# Patient Record
Sex: MALE | Race: BLACK OR AFRICAN AMERICAN | Employment: UNEMPLOYED | URBAN - METROPOLITAN AREA
[De-identification: names, ages, dates, MRNs, and addresses within clinical notes are randomized per-mention and may not be internally consistent; named-entity substitution may affect disease eponyms.]

---

## 2018-09-20 ENCOUNTER — APPOINTMENT (OUTPATIENT)
Dept: ULTRASOUND IMAGING | Facility: HOSPITAL | Age: 18
End: 2018-09-20
Attending: EMERGENCY MEDICINE
Payer: MEDICAID

## 2018-09-20 ENCOUNTER — HOSPITAL ENCOUNTER (EMERGENCY)
Facility: HOSPITAL | Age: 18
Discharge: HOME | End: 2018-09-20
Attending: EMERGENCY MEDICINE
Payer: MEDICAID

## 2018-09-20 VITALS
DIASTOLIC BLOOD PRESSURE: 70 MMHG | RESPIRATION RATE: 18 BRPM | HEIGHT: 73 IN | OXYGEN SATURATION: 100 % | TEMPERATURE: 98 F | HEART RATE: 72 BPM | BODY MASS INDEX: 23.19 KG/M2 | WEIGHT: 175 LBS | SYSTOLIC BLOOD PRESSURE: 130 MMHG

## 2018-09-20 DIAGNOSIS — N39.0 URINARY TRACT INFECTION WITHOUT HEMATURIA, SITE UNSPECIFIED: Primary | ICD-10-CM

## 2018-09-20 LAB
BILIRUB UR QL: NEGATIVE
C TRACH DNA SPEC QL NAA+PROBE: NEGATIVE
COLOR UR: YELLOW
GLUCOSE UR-MCNC: NEGATIVE MG/DL
HGB UR QL STRIP.AUTO: NEGATIVE
KETONES UR-MCNC: NEGATIVE MG/DL
N GONORRHOEA DNA SPEC QL NAA+PROBE: NEGATIVE
NITRITE UR QL STRIP.AUTO: NEGATIVE
PH UR: 7 [PH] (ref 5–8)
PROT UR-MCNC: NEGATIVE MG/DL
RBC #/AREA URNS AUTO: 5 /HPF
SP GR UR STRIP: 1.02 (ref 1–1.03)
UROBILINOGEN UR STRIP-ACNC: 4
VIT C UR-MCNC: NEGATIVE MG/DL
WBC #/AREA URNS AUTO: 50 /HPF

## 2018-09-20 PROCEDURE — 93975 VASCULAR STUDY: CPT | Performed by: EMERGENCY MEDICINE

## 2018-09-20 PROCEDURE — 99284 EMERGENCY DEPT VISIT MOD MDM: CPT

## 2018-09-20 PROCEDURE — 87591 N.GONORRHOEAE DNA AMP PROB: CPT | Performed by: EMERGENCY MEDICINE

## 2018-09-20 PROCEDURE — 87086 URINE CULTURE/COLONY COUNT: CPT | Performed by: EMERGENCY MEDICINE

## 2018-09-20 PROCEDURE — 87491 CHLMYD TRACH DNA AMP PROBE: CPT | Performed by: EMERGENCY MEDICINE

## 2018-09-20 PROCEDURE — 81001 URINALYSIS AUTO W/SCOPE: CPT | Performed by: EMERGENCY MEDICINE

## 2018-09-20 PROCEDURE — 76870 US EXAM SCROTUM: CPT | Performed by: EMERGENCY MEDICINE

## 2018-09-20 RX ORDER — CIPROFLOXACIN 250 MG/1
250 TABLET, FILM COATED ORAL 2 TIMES DAILY
Qty: 14 TABLET | Refills: 0 | Status: SHIPPED | OUTPATIENT
Start: 2018-09-20 | End: 2018-09-27

## 2018-09-20 NOTE — ED PROVIDER NOTES
Patient Seen in: [facility] AND [facility] Emergency Department    History   Patient presents with:  Male  Problem    Stated Complaint: leg pain    HPI    20 yo male presents with one day of right groin and right testicle pain. No urinary symptoms.  No vomiting exhibits no edema or tenderness. There is right groin and medial right thigh tenderness. Lymphadenopathy:     He has no cervical adenopathy. Neurological: He is alert and oriented to person, place, and time. No cranial nerve deficit.    Skin: Skin is

## 2022-11-11 ENCOUNTER — TELEPHONE (OUTPATIENT)
Dept: FAMILY MEDICINE CLINIC | Facility: CLINIC | Age: 22
End: 2022-11-11

## 2022-11-11 NOTE — TELEPHONE ENCOUNTER
Reason for Call/Symptoms: Pt has sharp dull pain on his groin area on the right side where his leg starts. Pt stated that he did a split and since then the pain has been on/off but for the past 2 days the pain is a 10/10. Onset: The pain started about 1 month or 2 months ago. Courtesy Assessment: constant pain for the past 2 days 10/10. Level of care recommendation: Immediate Care  Advice given to patient: Pt was informed to go to I/C today. Pt stated that he will go to I/C today.

## 2023-05-19 ENCOUNTER — NURSE TRIAGE (OUTPATIENT)
Dept: FAMILY MEDICINE CLINIC | Facility: CLINIC | Age: 23
End: 2023-05-19

## 2023-05-22 ENCOUNTER — OFFICE VISIT (OUTPATIENT)
Dept: FAMILY MEDICINE CLINIC | Facility: CLINIC | Age: 23
End: 2023-05-22

## 2023-05-22 ENCOUNTER — LAB ENCOUNTER (OUTPATIENT)
Dept: LAB | Age: 23
End: 2023-05-22
Payer: MEDICAID

## 2023-05-22 VITALS
HEIGHT: 72 IN | TEMPERATURE: 98 F | BODY MASS INDEX: 21.67 KG/M2 | OXYGEN SATURATION: 95 % | WEIGHT: 160 LBS | HEART RATE: 80 BPM | SYSTOLIC BLOOD PRESSURE: 110 MMHG | DIASTOLIC BLOOD PRESSURE: 68 MMHG

## 2023-05-22 DIAGNOSIS — Z11.3 SCREENING EXAMINATION FOR STD (SEXUALLY TRANSMITTED DISEASE): ICD-10-CM

## 2023-05-22 DIAGNOSIS — R10.31 RIGHT GROIN PAIN: Primary | ICD-10-CM

## 2023-05-22 PROCEDURE — 87491 CHLMYD TRACH DNA AMP PROBE: CPT

## 2023-05-22 PROCEDURE — 3078F DIAST BP <80 MM HG: CPT

## 2023-05-22 PROCEDURE — 36415 COLL VENOUS BLD VENIPUNCTURE: CPT

## 2023-05-22 PROCEDURE — 3008F BODY MASS INDEX DOCD: CPT

## 2023-05-22 PROCEDURE — 87389 HIV-1 AG W/HIV-1&-2 AB AG IA: CPT

## 2023-05-22 PROCEDURE — 86780 TREPONEMA PALLIDUM: CPT

## 2023-05-22 PROCEDURE — 87591 N.GONORRHOEAE DNA AMP PROB: CPT

## 2023-05-22 PROCEDURE — 99204 OFFICE O/P NEW MOD 45 MIN: CPT

## 2023-05-22 PROCEDURE — 3074F SYST BP LT 130 MM HG: CPT

## 2023-05-23 LAB
C TRACH DNA SPEC QL NAA+PROBE: NEGATIVE
N GONORRHOEA DNA SPEC QL NAA+PROBE: NEGATIVE

## 2023-05-24 ENCOUNTER — PATIENT MESSAGE (OUTPATIENT)
Dept: FAMILY MEDICINE CLINIC | Facility: CLINIC | Age: 23
End: 2023-05-24

## 2023-05-24 LAB — T PALLIDUM AB SER QL: NEGATIVE

## 2023-05-25 NOTE — TELEPHONE ENCOUNTER
From: Gerber Ludwig  To: Caryle Lusty, APRN  Sent: 5/24/2023 11:44 AM CDT  Subject: Question regarding CHLAMYDIA/GONOCOCCUS, OWEN    So everything came back good all negative? ?

## 2023-06-16 ENCOUNTER — TELEPHONE (OUTPATIENT)
Dept: FAMILY MEDICINE CLINIC | Facility: CLINIC | Age: 23
End: 2023-06-16

## 2023-06-16 DIAGNOSIS — R10.31 RIGHT GROIN PAIN: Primary | ICD-10-CM

## 2023-07-26 ENCOUNTER — OFFICE VISIT (OUTPATIENT)
Dept: FAMILY MEDICINE CLINIC | Facility: CLINIC | Age: 23
End: 2023-07-26

## 2023-07-26 ENCOUNTER — LAB ENCOUNTER (OUTPATIENT)
Dept: LAB | Facility: REFERENCE LAB | Age: 23
End: 2023-07-26
Attending: FAMILY MEDICINE
Payer: MEDICAID

## 2023-07-26 VITALS
SYSTOLIC BLOOD PRESSURE: 126 MMHG | WEIGHT: 157 LBS | RESPIRATION RATE: 17 BRPM | HEIGHT: 72 IN | DIASTOLIC BLOOD PRESSURE: 77 MMHG | OXYGEN SATURATION: 98 % | HEART RATE: 78 BPM | BODY MASS INDEX: 21.26 KG/M2

## 2023-07-26 DIAGNOSIS — Z11.3 SCREENING EXAMINATION FOR STD (SEXUALLY TRANSMITTED DISEASE): ICD-10-CM

## 2023-07-26 DIAGNOSIS — Z00.00 ANNUAL PHYSICAL EXAM: ICD-10-CM

## 2023-07-26 DIAGNOSIS — Z00.00 ANNUAL PHYSICAL EXAM: Primary | ICD-10-CM

## 2023-07-26 LAB
ANION GAP SERPL CALC-SCNC: 4 MMOL/L (ref 0–18)
BUN BLD-MCNC: 15 MG/DL (ref 7–18)
BUN/CREAT SERPL: 12.9 (ref 10–20)
CALCIUM BLD-MCNC: 9 MG/DL (ref 8.5–10.1)
CHLORIDE SERPL-SCNC: 108 MMOL/L (ref 98–112)
CO2 SERPL-SCNC: 28 MMOL/L (ref 21–32)
CREAT BLD-MCNC: 1.16 MG/DL
DEPRECATED RDW RBC AUTO: 37 FL (ref 35.1–46.3)
EGFRCR SERPLBLD CKD-EPI 2021: 90 ML/MIN/1.73M2 (ref 60–?)
ERYTHROCYTE [DISTWIDTH] IN BLOOD BY AUTOMATED COUNT: 13.4 % (ref 11–15)
GLUCOSE BLD-MCNC: 89 MG/DL (ref 70–99)
HCT VFR BLD AUTO: 39 %
HGB BLD-MCNC: 12.5 G/DL
IRON SATN MFR SERPL: 32 %
IRON SERPL-MCNC: 99 UG/DL
MCH RBC QN AUTO: 24.4 PG (ref 26–34)
MCHC RBC AUTO-ENTMCNC: 32.1 G/DL (ref 31–37)
MCV RBC AUTO: 76.2 FL
OSMOLALITY SERPL CALC.SUM OF ELEC: 290 MOSM/KG (ref 275–295)
PLATELET # BLD AUTO: 304 10(3)UL (ref 150–450)
POTASSIUM SERPL-SCNC: 3.6 MMOL/L (ref 3.5–5.1)
RBC # BLD AUTO: 5.12 X10(6)UL
SODIUM SERPL-SCNC: 140 MMOL/L (ref 136–145)
TIBC SERPL-MCNC: 308 UG/DL (ref 240–450)
TRANSFERRIN SERPL-MCNC: 207 MG/DL (ref 200–360)
WBC # BLD AUTO: 3.8 X10(3) UL (ref 4–11)

## 2023-07-26 PROCEDURE — 36415 COLL VENOUS BLD VENIPUNCTURE: CPT

## 2023-07-26 PROCEDURE — 90471 IMMUNIZATION ADMIN: CPT | Performed by: FAMILY MEDICINE

## 2023-07-26 PROCEDURE — 84466 ASSAY OF TRANSFERRIN: CPT

## 2023-07-26 PROCEDURE — 83540 ASSAY OF IRON: CPT

## 2023-07-26 PROCEDURE — 3008F BODY MASS INDEX DOCD: CPT | Performed by: FAMILY MEDICINE

## 2023-07-26 PROCEDURE — 90472 IMMUNIZATION ADMIN EACH ADD: CPT | Performed by: FAMILY MEDICINE

## 2023-07-26 PROCEDURE — 80048 BASIC METABOLIC PNL TOTAL CA: CPT

## 2023-07-26 PROCEDURE — 90651 9VHPV VACCINE 2/3 DOSE IM: CPT | Performed by: FAMILY MEDICINE

## 2023-07-26 PROCEDURE — 86780 TREPONEMA PALLIDUM: CPT

## 2023-07-26 PROCEDURE — 90715 TDAP VACCINE 7 YRS/> IM: CPT | Performed by: FAMILY MEDICINE

## 2023-07-26 PROCEDURE — 85027 COMPLETE CBC AUTOMATED: CPT

## 2023-07-26 PROCEDURE — 87591 N.GONORRHOEAE DNA AMP PROB: CPT

## 2023-07-26 PROCEDURE — 3074F SYST BP LT 130 MM HG: CPT | Performed by: FAMILY MEDICINE

## 2023-07-26 PROCEDURE — 99395 PREV VISIT EST AGE 18-39: CPT | Performed by: FAMILY MEDICINE

## 2023-07-26 PROCEDURE — 87491 CHLMYD TRACH DNA AMP PROBE: CPT

## 2023-07-26 PROCEDURE — 3078F DIAST BP <80 MM HG: CPT | Performed by: FAMILY MEDICINE

## 2023-07-26 PROCEDURE — 87389 HIV-1 AG W/HIV-1&-2 AB AG IA: CPT

## 2023-07-26 NOTE — PROGRESS NOTES
HPI:    Patti Ramires is a 23year old male presents clinic for annual physical exam.  No acute concerns. Requesting STD screening. Denies new partners. Asymptomatic. Chronic hip pain, has MRI scheduled next week. Normal appetite, balanced diet. Tries to exercise when possible. Normal bowel movements and urination. Normal sleep habits. Was told that his iron was low when he was in the [employer]. Would like levels rechecked. HISTORY:  No past medical history on file. No past surgical history on file. Family History   Problem Relation Age of Onset    Cancer Mother     Diabetes Mother       Social History:   Social History     Socioeconomic History    Marital status: Single   Tobacco Use    Smoking status: Never    Smokeless tobacco: Never   Vaping Use    Vaping Use: Never used   Substance and Sexual Activity    Alcohol use: Not Currently    Drug use: Yes     Types: Cannabis    Sexual activity: Yes        Medications (Active prior to today's visit):  No current outpatient medications on file. Allergies:  No Known Allergies       ROS:   Review of Systems   All other systems reviewed and are negative. PHYSICAL EXAM:      07/26/23  1324   BP: 126/77   BP Location: Right arm   Patient Position: Sitting   Cuff Size: adult   Pulse: 78   Resp: 17   SpO2: 98%   Weight: 157 lb (71.2 kg)   Height: 6' (1.829 m)     Physical Exam  Vitals reviewed. Constitutional:       General: He is not in acute distress. HENT:      Head: Normocephalic and atraumatic. Right Ear: Tympanic membrane, ear canal and external ear normal.      Left Ear: Tympanic membrane, ear canal and external ear normal.      Nose: Nose normal.      Mouth/Throat:      Pharynx: Uvula midline. Eyes:      Conjunctiva/sclera: Conjunctivae normal.      Pupils: Pupils are equal, round, and reactive to light. Neck:      Thyroid: No thyromegaly. Cardiovascular:      Rate and Rhythm: Normal rate and regular rhythm.       Heart sounds: Normal heart sounds. No murmur heard. Pulmonary:      Effort: Pulmonary effort is normal. No respiratory distress. Breath sounds: Normal breath sounds. No wheezing or rales. Abdominal:      General: Bowel sounds are normal. There is no distension. Palpations: Abdomen is soft. Tenderness: There is no abdominal tenderness. There is no guarding or rebound. Musculoskeletal:      Cervical back: Normal range of motion and neck supple. Lymphadenopathy:      Cervical: No cervical adenopathy. Neurological:      Mental Status: He is alert. ASSESSMENT/PLAN:   (Z00.00) Annual physical exam  (primary encounter diagnosis)  (Z11.3) Screening examination for STD (sexually transmitted disease)  Plan:   -Tdap and Gardasil vaccines given  -Annual labs, STI screening ordered  - safe sexual practices advised  - Reinforced healthy diet, lifestyle, and exercise. - Past Medical/Social/Family histories reviewed  - Regular dental visits recommended   - Regular eye exams recommended     Follow up in 1 year or sooner if needed         Responsible party/patient verbalized understanding of information discussed. No barriers to learning observed. Orders This Visit:  Orders Placed This Encounter      HIV AG AB Combo [E]      T Pallidum Screening [place] TREP [E]      CBC, Platelet, No Differential [E]      Iron And Tibc [E]      GARDASIL 9      TETANUS, DIPHTHERIA TOXOIDS AND ACELLULAR PERTUSIS VACCINE (TDAP), >7 YEARS, IM USE      Chlamydia/Gc Amplification [E]      Meds This Visit:  Requested Prescriptions      No prescriptions requested or ordered in this encounter       Imaging & Referrals:  HPV HUMAN PAPILLOMA VIRUS VACC 9 FRANCHESCA 3 DOSE IM  TETANUS, DIPHTHERIA TOXOIDS AND ACELLULAR PERTUSIS VACCINE (TDAP), >7 YEARS, IM USE       The 21st Century cures Act makes medical notes like these available to patients in the interest of transparency. However, be advised that this is a medical document.   It is intended as peer to peer communication. It is written in medical language and may contain abbreviations or verbiage that are unfamiliar. It may appear blunt or direct. Medical documents are intended to carry relevant information, facts as evident, and the clinical opinion of the practitioner. This note was created by [employer] voice recognition. Errors in content may be related to improper recognition by the system; efforts to review and correct have been done but errors may still exist. Please contact me with any questions.        7/26/2023  Charol Merlin, MD

## 2023-07-27 ENCOUNTER — PATIENT MESSAGE (OUTPATIENT)
Dept: FAMILY MEDICINE CLINIC | Facility: CLINIC | Age: 23
End: 2023-07-27

## 2023-07-27 LAB
C TRACH DNA SPEC QL NAA+PROBE: NEGATIVE
N GONORRHOEA DNA SPEC QL NAA+PROBE: NEGATIVE

## 2023-07-27 NOTE — TELEPHONE ENCOUNTER
From: Maritza Alvarez  To: Farrah Batres MD  Sent: 7/27/2023 1:22 PM CDT  Subject: I want to know what they mean & how I can better each and every one    HBG%  HCT%  MCV%   MCH%  WCB%  MCHC%  RDW-SD%   Potassium %  Transferrin%  Can I have the meaning of each term?  And why they are low in percentage

## 2023-07-27 NOTE — TELEPHONE ENCOUNTER
From: Michael Thornton  To: SETH Metcalf  Sent: 7/27/2023 11:53 AM CDT  Subject: Test results     HBG%  HCT%  MCV%   MCH%  WCB%  MCHC%  RDW-SD%   Potassium %  Transferrin%  Can I have the meaning of each term?  And why they are low in percentage

## 2023-07-27 NOTE — TELEPHONE ENCOUNTER
From: Tabatha Sun  To:  Zi Tristan MD  Sent: 7/27/2023 3:33 AM CDT  Subject: Question regarding BASIC METABOLIC PANEL (8)    My potassium levels seem low

## 2023-07-28 LAB — T PALLIDUM AB SER QL: NEGATIVE

## 2023-08-01 ENCOUNTER — HOSPITAL ENCOUNTER (OUTPATIENT)
Dept: MRI IMAGING | Facility: HOSPITAL | Age: 23
Discharge: HOME | End: 2023-08-01
Payer: MEDICAID

## 2023-08-01 DIAGNOSIS — R10.31 RIGHT GROIN PAIN: ICD-10-CM

## 2023-08-01 PROCEDURE — 73721 MRI JNT OF LWR EXTRE W/O DYE: CPT

## 2023-08-02 ENCOUNTER — PATIENT MESSAGE (OUTPATIENT)
Dept: FAMILY MEDICINE CLINIC | Facility: CLINIC | Age: 23
End: 2023-08-02

## 2023-08-02 ENCOUNTER — TELEPHONE (OUTPATIENT)
Dept: FAMILY MEDICINE CLINIC | Facility: CLINIC | Age: 23
End: 2023-08-02

## 2023-08-02 DIAGNOSIS — R10.31 RIGHT GROIN PAIN: Primary | ICD-10-CM

## 2023-08-02 NOTE — TELEPHONE ENCOUNTER
From: Rashi Vazquez  To: Caryle Lusty, APRN  Sent: 8/2/2023 10:50 AM CDT  Subject: Question regarding MRI HIPS, RIGHT (WITHOUT CONTRAST) (CPT=73721)    Yes amanda can we schedule the [facility] specialist?

## 2023-08-02 NOTE — TELEPHONE ENCOUNTER
Please be advised the request for MRI Hip W & W/O (CPT 23320) has been denied by the insurance. The insurance is requesting an urgent [id number].     P2P # [phone], Option 4  Reference # [id number]    The following is the letter received from insurance with reasoning:

## 2023-08-03 NOTE — TELEPHONE ENCOUNTER
From: Alonso Boyle  To:  Callum Bianchi MD  Sent: 8/2/2023 1:53 PM CDT  Subject: My left shoulder has swollen     The same day when I left the mri and the spot where I got one of the vaccines has gotten swollen ken firm and kind of itchy

## 2023-08-10 ENCOUNTER — OFFICE VISIT (OUTPATIENT)
Dept: FAMILY MEDICINE CLINIC | Facility: CLINIC | Age: 23
End: 2023-08-10

## 2023-08-10 VITALS
HEIGHT: 72 IN | TEMPERATURE: 98 F | BODY MASS INDEX: 21.26 KG/M2 | HEART RATE: 79 BPM | WEIGHT: 157 LBS | OXYGEN SATURATION: 96 % | DIASTOLIC BLOOD PRESSURE: 64 MMHG | SYSTOLIC BLOOD PRESSURE: 124 MMHG

## 2023-08-10 DIAGNOSIS — G44.209 ACUTE NON INTRACTABLE TENSION-TYPE HEADACHE: Primary | ICD-10-CM

## 2023-08-10 PROCEDURE — 3008F BODY MASS INDEX DOCD: CPT | Performed by: FAMILY MEDICINE

## 2023-08-10 PROCEDURE — 3074F SYST BP LT 130 MM HG: CPT | Performed by: FAMILY MEDICINE

## 2023-08-10 PROCEDURE — 3078F DIAST BP <80 MM HG: CPT | Performed by: FAMILY MEDICINE

## 2023-08-10 PROCEDURE — 99213 OFFICE O/P EST LOW 20 MIN: CPT | Performed by: FAMILY MEDICINE

## 2023-08-10 RX ORDER — NAPROXEN 500 MG/1
500 TABLET ORAL 2 TIMES DAILY WITH MEALS
Qty: 14 TABLET | Refills: 0 | Status: SHIPPED | OUTPATIENT
Start: 2023-08-10 | End: 2023-08-17

## 2024-04-29 ENCOUNTER — OFFICE VISIT (OUTPATIENT)
Dept: FAMILY MEDICINE CLINIC | Facility: CLINIC | Age: 24
End: 2024-04-29

## 2024-04-29 ENCOUNTER — LAB ENCOUNTER (OUTPATIENT)
Dept: LAB | Age: 24
End: 2024-04-29
Payer: MEDICAID

## 2024-04-29 VITALS
RESPIRATION RATE: 18 BRPM | OXYGEN SATURATION: 98 % | HEIGHT: 72 IN | SYSTOLIC BLOOD PRESSURE: 130 MMHG | BODY MASS INDEX: 23.03 KG/M2 | DIASTOLIC BLOOD PRESSURE: 71 MMHG | HEART RATE: 71 BPM | WEIGHT: 170 LBS

## 2024-04-29 DIAGNOSIS — Z23 NEED FOR VACCINATION: ICD-10-CM

## 2024-04-29 DIAGNOSIS — Z11.3 SCREENING EXAMINATION FOR STD (SEXUALLY TRANSMITTED DISEASE): ICD-10-CM

## 2024-04-29 DIAGNOSIS — F12.90 MARIJUANA USE: ICD-10-CM

## 2024-04-29 DIAGNOSIS — Z00.00 ENCOUNTER FOR ANNUAL PHYSICAL EXAM: Primary | ICD-10-CM

## 2024-04-29 LAB — T PALLIDUM AB SER QL IA: NONREACTIVE

## 2024-04-29 PROCEDURE — 87491 CHLMYD TRACH DNA AMP PROBE: CPT

## 2024-04-29 PROCEDURE — 36415 COLL VENOUS BLD VENIPUNCTURE: CPT

## 2024-04-29 PROCEDURE — 90471 IMMUNIZATION ADMIN: CPT

## 2024-04-29 PROCEDURE — 90651 9VHPV VACCINE 2/3 DOSE IM: CPT

## 2024-04-29 PROCEDURE — 87389 HIV-1 AG W/HIV-1&-2 AB AG IA: CPT

## 2024-04-29 PROCEDURE — 99395 PREV VISIT EST AGE 18-39: CPT

## 2024-04-29 PROCEDURE — 87591 N.GONORRHOEAE DNA AMP PROB: CPT

## 2024-04-29 PROCEDURE — 86780 TREPONEMA PALLIDUM: CPT

## 2024-04-29 NOTE — PROGRESS NOTES
Fifi Caballero is a 23 year old male.  Chief Complaint   Patient presents with    STD     Pt requesting STD testing , pt denies known exposure.     HPI:   Fifi Caballero presented to the clinic for annual physical examination. No acute concerns. No changes in family/personal history. Normal Sleep. Normal appetite. Balanced diet. Normal BM/Urination. Physically active.  Sexually active. Would like STD screening.  Denies known exposure. Alcohol - socially. Marijuana - unable to quantify for me. Says \" a lot, too much\". Interested in smoking cessation.      No current outpatient medications on file.      History reviewed. No pertinent past medical history.   History reviewed. No pertinent surgical history.   Social History:  Social History     Socioeconomic History    Marital status: Single   Tobacco Use    Smoking status: Never    Smokeless tobacco: Never   Vaping Use    Vaping status: Never Used   Substance and Sexual Activity    Alcohol use: Not Currently    Drug use: Yes     Types: Cannabis    Sexual activity: Yes      Family History   Problem Relation Age of Onset    Cancer Mother     Diabetes Mother       No Known Allergies     REVIEW OF SYSTEMS:   Review of Systems   Constitutional:  Negative for activity change.   HENT: Negative.     Eyes: Negative.    Respiratory:  Negative for chest tightness and shortness of breath.    Cardiovascular:  Negative for chest pain and palpitations.   Gastrointestinal:  Negative for abdominal pain, constipation and diarrhea.   Genitourinary: Negative.  Negative for difficulty urinating.   Musculoskeletal: Negative.    Skin: Negative.    Neurological: Negative.  Negative for dizziness and headaches.   Psychiatric/Behavioral: Negative.     All other systems reviewed and are negative.     Wt Readings from Last 5 Encounters:   04/29/24 170 lb (77.1 kg)   08/10/23 157 lb (71.2 kg)   07/26/23 157 lb (71.2 kg)   05/22/23 160 lb (72.6 kg)   09/20/18 175 lb (79.4 kg)     Body  mass index is 23.06 kg/m².      EXAM:   /71 (BP Location: Left arm, Patient Position: Sitting, Cuff Size: adult)   Pulse 71   Resp 18   Ht 6' (1.829 m)   Wt 170 lb (77.1 kg)   SpO2 98%   BMI 23.06 kg/m²   Physical Exam  Vitals and nursing note reviewed.   Constitutional:       Appearance: Normal appearance.   HENT:      Head: Normocephalic and atraumatic.      Right Ear: Tympanic membrane and external ear normal.      Left Ear: Tympanic membrane and external ear normal.      Mouth/Throat:      Mouth: Mucous membranes are moist.      Pharynx: Oropharynx is clear.   Eyes:      Conjunctiva/sclera: Conjunctivae normal.      Pupils: Pupils are equal, round, and reactive to light.   Cardiovascular:      Rate and Rhythm: Normal rate and regular rhythm.      Pulses: Normal pulses.      Heart sounds: Normal heart sounds.   Pulmonary:      Effort: Pulmonary effort is normal.      Breath sounds: No wheezing.   Abdominal:      General: Abdomen is flat. There is no distension.      Palpations: Abdomen is soft. There is no mass.      Tenderness: There is no abdominal tenderness. There is no guarding.      Hernia: No hernia is present.   Musculoskeletal:         General: Normal range of motion.      Cervical back: Normal range of motion.   Skin:     General: Skin is warm.   Neurological:      General: No focal deficit present.      Mental Status: He is alert and oriented to person, place, and time.   Psychiatric:         Mood and Affect: Mood normal.         Behavior: Behavior normal.            ASSESSMENT AND PLAN:   (Z00.00) Encounter for annual physical exam  (primary encounter diagnosis)  Plan:   - Immunizations UTD   - tobacco, alcohol, illicit drug use discouraged  - safe sexual practices advised  - Reinforced healthy diet, lifestyle, and exercise.  - Past Medical/Social/Family histories reviewed   - Regular dental visits recommended   - Regular eye exams recommended     (Z11.3) Screening examination for STD  (sexually transmitted disease)  Plan: HIV AG AB Combo [E], T Pallidum Screening         [place] TREP [E], Chlamydia/Gc Amplification         [E]  STD screening completed today. Safe sexual practices advised. Further intervention pending results.     (F12.90) Marijuana use  Plan: patient smokes marijuana daily. Unable to quantify amount. Interested in cessation. Discussed weaning off. Agreeable to plan.     (Z23) Need for vaccination  Plan: GARDASIL 9, Immunization Admin Counseling, 1st         Component, 18 years and older        Vaccination given today. Will follow up for 3rd/final dose. Verbalized understanding.       Follow up in 1 year or sooner if needed      The patient indicates understanding of these issues and agrees to the plan.  Chaperone offered to the patient prior to examination    This note was prepared using [employer] voice recognition dictation software. As a result errors may occur. When identified these errors have been corrected. While every attempt is made to correct errors during dictation discrepancies may still exist.

## 2024-04-30 LAB
C TRACH DNA SPEC QL NAA+PROBE: NEGATIVE
N GONORRHOEA DNA SPEC QL NAA+PROBE: NEGATIVE

## 2024-07-19 ENCOUNTER — HOSPITAL ENCOUNTER (EMERGENCY)
Facility: HOSPITAL | Age: 24
Discharge: HOME | End: 2024-07-19
Payer: MEDICAID

## 2024-07-19 VITALS
TEMPERATURE: 99 F | HEIGHT: 74 IN | OXYGEN SATURATION: 99 % | HEART RATE: 84 BPM | DIASTOLIC BLOOD PRESSURE: 78 MMHG | BODY MASS INDEX: 22.46 KG/M2 | RESPIRATION RATE: 16 BRPM | SYSTOLIC BLOOD PRESSURE: 132 MMHG | WEIGHT: 175 LBS

## 2024-07-19 DIAGNOSIS — Z20.2 POSSIBLE EXPOSURE TO STD: Primary | ICD-10-CM

## 2024-07-19 PROCEDURE — 87661 TRICHOMONAS VAGINALIS AMPLIF: CPT | Performed by: NURSE PRACTITIONER

## 2024-07-19 PROCEDURE — 99283 EMERGENCY DEPT VISIT LOW MDM: CPT

## 2024-07-19 PROCEDURE — 87591 N.GONORRHOEAE DNA AMP PROB: CPT | Performed by: NURSE PRACTITIONER

## 2024-07-19 PROCEDURE — 87491 CHLMYD TRACH DNA AMP PROBE: CPT | Performed by: NURSE PRACTITIONER

## 2024-07-19 NOTE — ED PROVIDER NOTES
Patient Seen in: [facility] Emergency Department      History     Chief Complaint   Patient presents with    Eval-G     Stated Complaint: Eval-G, testing    Subjective:   22yo/m w no chronic medical problems reports to the ED w c/o asymptomatic std testing. Reports \"I do this everytime I go raw dog\". No pain. No discharge. No urinary symptoms. No fever. No known exposures. No testicular pain/swelling.              Objective:   History reviewed. No pertinent past medical history.           History reviewed. No pertinent surgical history.             Social History     Socioeconomic History    Marital status:    Tobacco Use    Smoking status: Never    Smokeless tobacco: Never   Vaping Use    Vaping status: Never Used   Substance and Sexual Activity    Alcohol use: Not Currently    Drug use: Yes     Types: Cannabis    Sexual activity: Yes              Review of Systems   All other systems reviewed and are negative.      Positive for stated Chief Complaint: Eval-G    Other systems are as noted in HPI.  Constitutional and vital signs reviewed.      All other systems reviewed and negative except as noted above.    Physical Exam     ED Triage Vitals [07/19/24 1546]   /78   Pulse 84   Resp 16   Temp 99.1 °F (37.3 °C)   Temp src Temporal   SpO2 99 %   O2 Device None (Room air)       Current Vitals:   Vital Signs  BP: 132/78  Pulse: 84  Resp: 16  Temp: 99.1 °F (37.3 °C)  Temp src: Temporal    Oxygen Therapy  SpO2: 99 %  O2 Device: None (Room air)            Physical Exam  Vitals and nursing note reviewed.   Constitutional:       General: He is not in acute distress.     Appearance: He is well-developed.   HENT:      Head: Normocephalic and atraumatic.      Nose: Nose normal.      Mouth/Throat:      Mouth: Mucous membranes are moist.   Eyes:      Conjunctiva/sclera: Conjunctivae normal.      Pupils: Pupils are equal, round, and reactive to light.   Cardiovascular:      Rate and Rhythm: Normal rate and  regular rhythm.      Heart sounds: Normal heart sounds.   Pulmonary:      Effort: Pulmonary effort is normal.      Breath sounds: Normal breath sounds.   Abdominal:      General: Bowel sounds are normal.      Palpations: Abdomen is soft.   Musculoskeletal:         General: No tenderness or deformity. Normal range of motion.      Cervical back: Normal range of motion and neck supple.   Skin:     General: Skin is warm and dry.      Capillary Refill: Capillary refill takes less than 2 seconds.      Findings: No rash.      Comments: Normal color   Neurological:      General: No focal deficit present.      Mental Status: He is alert and oriented to person, place, and time.      GCS: GCS eye subscore is 4. GCS verbal subscore is 5. GCS motor subscore is 6.      Cranial Nerves: No cranial nerve deficit.      Gait: Gait normal.               ED Course     Labs Reviewed   TRICH VAG BY OWEN   CHLAMYDIA/GONOCOCCUS, OWEN               MDM                           Medical Decision Making  22yo/m w hx and exam as stated; std check    No symptoms  Does not want empiric tx  No fever  No testicular pain  No urinary symptoms  No fever  No vomiting    Plan  Urine culture  F/u with pcp for tx as indicated      Amount and/or Complexity of Data Reviewed  Labs:  Decision-making details documented in ED Course.    Risk  OTC drugs.  Prescription drug management.        Disposition and Plan     Clinical Impression:  1. Possible exposure to STD         Disposition:  Discharge  7/19/2024  4:08 pm    Follow-up:  Herbert Gonzalez MD  [address] [place] [id number] [phone]    Follow up in 2 day(s)            Medications Prescribed:  There are no discharge medications for this patient.

## 2024-07-22 LAB
C TRACH DNA SPEC QL NAA+PROBE: NEGATIVE
N GONORRHOEA DNA SPEC QL NAA+PROBE: NEGATIVE
TRICH VAG NAA: NEGATIVE

## 2024-10-15 ENCOUNTER — TELEPHONE (OUTPATIENT)
Dept: FAMILY MEDICINE CLINIC | Facility: CLINIC | Age: 24
End: 2024-10-15

## 2024-10-15 NOTE — TELEPHONE ENCOUNTER
Patient is tentative pending surgery date:  Will call back with date.    Dr. Roslyn Bah  Podiatrist    [facility]    Phone #  [phone]    Foot and ankle surgery    Patient scheduled a pre-clearance appointment for 10/29/24 with provider and advised of timeframe protocol for this type of appointment.      Please advise.

## 2025-01-02 ENCOUNTER — NURSE TRIAGE (OUTPATIENT)
Dept: FAMILY MEDICINE CLINIC | Facility: CLINIC | Age: 25
End: 2025-01-02

## 2025-01-02 NOTE — TELEPHONE ENCOUNTER
Patient self scheduled on MyChart,   Please see below,       Fifi Caballero MRN: [id number]    Date: 1/22/2025 Status: [facility]   Appt Time: 3:15 PM Length: 15   Visit Type: MYCHART EXAM [2964] Copay: $0.00   Provider: Herbert Gonzalez MD Department: [facility]   Referral Number:   Referral Status:     Enc Form Number: [id number]       Notes: I think my appendix is ruptured or it’s hurting my right side hurts   Made On: 1/1/2025 11:25 AM

## 2025-01-03 NOTE — TELEPHONE ENCOUNTER
2nd call attempt. Left message to call back.    Last read by Fifi Caballero at  4:14 PM on 1/2/2025.

## 2025-01-08 NOTE — TELEPHONE ENCOUNTER
Action Requested: Summary for Provider     []  Critical Lab, Recommendations Needed  [] Need Additional Advice  []   FYI    []   Need Orders  [] Need Medications Sent to Pharmacy  []  Other     SUMMARY: Triaged symptoms, patient called back.  He has been having pain that he was seen at [facility] for in December and has been there on and off for the last 2-3 years. Patient says pain starts in back and radiates to the front and goes down into leg. Patient is taking flexeril given by ER as needed to help with symptoms, not all the time. Patient reports pain is more of a dull ache and wraps around his waist. No blood in urine. When changes positions it is a little worse, sitting and twisting aggravated it. Moving bowels ok, no blood noted in stools. No fever. Able to get up and walk around ok. Pain does go into leg and down to calf. No nausea, no vomiting.     Er warning signs reviewed, he states understanding. Patient asking for CT or scans to help look into this. He did have xrays done at [facility] available via care everywhere.     Booked visit and agreeable.     Future Appointments   Date Time Provider Department Center   1/9/2025  2:00 PM Cheryl Nelson MD [facility] [place]   1/22/2025  3:15 PM Herbert Gonzalez MD [facility]         Reason for call: Abdominal Pain  Onset: \"last 2-3 years dull ache      Reason for Disposition   Pain radiates into the thigh or further down the leg    Protocols used: Back Pain-A-[place]

## 2025-01-14 ENCOUNTER — OFFICE VISIT (OUTPATIENT)
Dept: FAMILY MEDICINE CLINIC | Facility: CLINIC | Age: 25
End: 2025-01-14
Payer: MEDICAID

## 2025-01-14 ENCOUNTER — LAB ENCOUNTER (OUTPATIENT)
Dept: LAB | Age: 25
End: 2025-01-14
Attending: FAMILY MEDICINE
Payer: MEDICAID

## 2025-01-14 ENCOUNTER — PATIENT MESSAGE (OUTPATIENT)
Dept: FAMILY MEDICINE CLINIC | Facility: CLINIC | Age: 25
End: 2025-01-14

## 2025-01-14 VITALS
TEMPERATURE: 99 F | WEIGHT: 168 LBS | BODY MASS INDEX: 21.56 KG/M2 | HEART RATE: 69 BPM | DIASTOLIC BLOOD PRESSURE: 82 MMHG | SYSTOLIC BLOOD PRESSURE: 129 MMHG | HEIGHT: 74 IN

## 2025-01-14 DIAGNOSIS — M54.41 CHRONIC RIGHT-SIDED LOW BACK PAIN WITH RIGHT-SIDED SCIATICA: Primary | ICD-10-CM

## 2025-01-14 DIAGNOSIS — R10.9 RIGHT SIDED ABDOMINAL PAIN: ICD-10-CM

## 2025-01-14 DIAGNOSIS — N50.811 RIGHT TESTICULAR PAIN: ICD-10-CM

## 2025-01-14 DIAGNOSIS — G89.29 CHRONIC RIGHT-SIDED LOW BACK PAIN WITH RIGHT-SIDED SCIATICA: Primary | ICD-10-CM

## 2025-01-14 LAB
ALBUMIN SERPL-MCNC: 4.9 G/DL (ref 3.2–4.8)
ALBUMIN/GLOB SERPL: 1.7 {RATIO} (ref 1–2)
ALP LIVER SERPL-CCNC: 67 U/L
ALT SERPL-CCNC: 15 U/L
ANION GAP SERPL CALC-SCNC: 7 MMOL/L (ref 0–18)
AST SERPL-CCNC: 24 U/L (ref ?–34)
BASOPHILS # BLD AUTO: 0.08 X10(3) UL (ref 0–0.2)
BASOPHILS NFR BLD AUTO: 1.8 %
BILIRUB SERPL-MCNC: 1.1 MG/DL (ref 0.3–1.2)
BILIRUB UR QL: NEGATIVE
BUN BLD-MCNC: 8 MG/DL (ref 9–23)
BUN/CREAT SERPL: 6.8 (ref 10–20)
CALCIUM BLD-MCNC: 10 MG/DL (ref 8.7–10.4)
CHLORIDE SERPL-SCNC: 105 MMOL/L (ref 98–112)
CLARITY UR: CLEAR
CO2 SERPL-SCNC: 29 MMOL/L (ref 21–32)
CREAT BLD-MCNC: 1.18 MG/DL
DEPRECATED RDW RBC AUTO: 39.9 FL (ref 35.1–46.3)
EGFRCR SERPLBLD CKD-EPI 2021: 87 ML/MIN/1.73M2 (ref 60–?)
EOSINOPHIL # BLD AUTO: 0.15 X10(3) UL (ref 0–0.7)
EOSINOPHIL NFR BLD AUTO: 3.3 %
ERYTHROCYTE [DISTWIDTH] IN BLOOD BY AUTOMATED COUNT: 14.4 % (ref 11–15)
FASTING STATUS PATIENT QL REPORTED: YES
GLOBULIN PLAS-MCNC: 2.9 G/DL (ref 2–3.5)
GLUCOSE BLD-MCNC: 91 MG/DL (ref 70–99)
GLUCOSE UR-MCNC: NORMAL MG/DL
HCT VFR BLD AUTO: 40.1 %
HGB BLD-MCNC: 12.8 G/DL
HGB UR QL STRIP.AUTO: NEGATIVE
IMM GRANULOCYTES # BLD AUTO: 0 X10(3) UL (ref 0–1)
IMM GRANULOCYTES NFR BLD: 0 %
KETONES UR-MCNC: NEGATIVE MG/DL
LEUKOCYTE ESTERASE UR QL STRIP.AUTO: NEGATIVE
LYMPHOCYTES # BLD AUTO: 2.16 X10(3) UL (ref 1–4)
LYMPHOCYTES NFR BLD AUTO: 47.7 %
MCH RBC QN AUTO: 24.7 PG (ref 26–34)
MCHC RBC AUTO-ENTMCNC: 31.9 G/DL (ref 31–37)
MCV RBC AUTO: 77.3 FL
MONOCYTES # BLD AUTO: 0.37 X10(3) UL (ref 0.1–1)
MONOCYTES NFR BLD AUTO: 8.2 %
NEUTROPHILS # BLD AUTO: 1.77 X10 (3) UL (ref 1.5–7.7)
NEUTROPHILS # BLD AUTO: 1.77 X10(3) UL (ref 1.5–7.7)
NEUTROPHILS NFR BLD AUTO: 39 %
NITRITE UR QL STRIP.AUTO: NEGATIVE
OSMOLALITY SERPL CALC.SUM OF ELEC: 290 MOSM/KG (ref 275–295)
PH UR: 6.5 [PH] (ref 5–8)
PLATELET # BLD AUTO: 384 10(3)UL (ref 150–450)
POTASSIUM SERPL-SCNC: 4.3 MMOL/L (ref 3.5–5.1)
PROT SERPL-MCNC: 7.8 G/DL (ref 5.7–8.2)
PROT UR-MCNC: NEGATIVE MG/DL
RBC # BLD AUTO: 5.19 X10(6)UL
SODIUM SERPL-SCNC: 141 MMOL/L (ref 136–145)
SP GR UR STRIP: 1.02 (ref 1–1.03)
UROBILINOGEN UR STRIP-ACNC: NORMAL
WBC # BLD AUTO: 4.5 X10(3) UL (ref 4–11)

## 2025-01-14 PROCEDURE — 85025 COMPLETE CBC W/AUTO DIFF WBC: CPT

## 2025-01-14 PROCEDURE — 99214 OFFICE O/P EST MOD 30 MIN: CPT | Performed by: FAMILY MEDICINE

## 2025-01-14 PROCEDURE — 81003 URINALYSIS AUTO W/O SCOPE: CPT | Performed by: FAMILY MEDICINE

## 2025-01-14 PROCEDURE — 36415 COLL VENOUS BLD VENIPUNCTURE: CPT

## 2025-01-14 PROCEDURE — 80053 COMPREHEN METABOLIC PANEL: CPT

## 2025-01-14 RX ORDER — CYCLOBENZAPRINE HCL 5 MG
5 TABLET ORAL 2 TIMES DAILY
COMMUNITY

## 2025-01-14 RX ORDER — NAPROXEN 500 MG/1
500 TABLET ORAL
COMMUNITY
Start: 2024-12-25

## 2025-01-14 NOTE — PROGRESS NOTES
HPI:    Patient ID: Fifi Caballero is a 24 year old male.      Low Back Pain  Associated symptoms include abdominal pain. Pertinent negatives include no dysuria or fever.       Chief Complaint   Patient presents with    Low Back Pain     RT lower back pain radiates to right groin and R leg went to the ER 12-25-24 medication that was prescribed is not helping.        Wt Readings from Last 6 Encounters:   01/14/25 168 lb (76.2 kg)   07/19/24 175 lb (79.4 kg)   04/29/24 170 lb (77.1 kg)   08/10/23 157 lb (71.2 kg)   07/26/23 157 lb (71.2 kg)   05/22/23 160 lb (72.6 kg)     BP Readings from Last 3 Encounters:   01/14/25 129/82   07/19/24 132/78   04/29/24 130/71     Pain in right lower back  radiates to the front of right lower abdomen  Symptoms 2-3 yrs  Pain shoots in right leg, right testicle.  Drives a truck    Lifts weights more than 50lbs 2-3 x week  Symtoms started before lifting heavy weights     Has to sit sometimes longer to have bms.  No blood in stool/ black stool.    No injures to back     Seen multiple doctors for this    [facility] [place] 2018  Impression   CONCLUSION:  1. No evidence of testicular torsion.     2. Negative for sonographic evidence of epididymitis.     3. Small hydroceles are suggested bilaterally.             Right leg feels weaker     Mri right hip: 8/2023    CONCLUSION:      No acute fracture or other osseous abnormality.  No evidence of AVN.      Small foci of high signal in the anterosuperior labrum and far posterior labrum of the right hip are favored to represent normal labral clefts.  These findings are symmetric to the left hip.  Small labral tears are less likely.         Denies hx of stds     reviewed chart    Had xrays for rigth femur, hip, knee, tibia, fibula and foot dec 2024      Neg   Did not see xray for back         Review of Systems   Constitutional:  Negative for chills, fever and unexpected weight change.   Respiratory:  Negative for cough and shortness of breath.     Gastrointestinal:  Positive for abdominal pain and constipation. Negative for anal bleeding, blood in stool, diarrhea, nausea, rectal pain and vomiting.   Genitourinary:  Positive for testicular pain. Negative for decreased urine volume, difficulty urinating, dysuria, enuresis, flank pain, frequency, genital sores, hematuria, penile discharge, penile pain, penile swelling, scrotal swelling and urgency.   Musculoskeletal:  Positive for back pain and myalgias. Negative for arthralgias, gait problem, joint swelling, neck pain and neck stiffness.   Neurological:  Negative for dizziness.       /82   Pulse 69   Temp 98.5 °F (36.9 °C) (Temporal)   Ht 6' 2\" (1.88 m)   Wt 168 lb (76.2 kg)   BMI 21.57 kg/m²          Current Outpatient Medications   Medication Sig Dispense Refill    naproxen 500 MG Oral Tab Take 1 tablet (500 mg total) by mouth.      cyclobenzaprine 5 MG Oral Tab Take 1 tablet (5 mg total) by mouth 2 (two) times daily.       Allergies:Allergies[1]   PHYSICAL EXAM:     Chief Complaint   Patient presents with    Low Back Pain     RT lower back pain radiates to right groin and R leg went to the ER 12-25-24 medication that was prescribed is not helping.       Physical Exam  Vitals reviewed.   Cardiovascular:      Rate and Rhythm: Normal rate and regular rhythm.      Pulses: Normal pulses.      Heart sounds: Normal heart sounds.   Pulmonary:      Effort: Pulmonary effort is normal.      Breath sounds: Normal breath sounds.   Abdominal:      General: There is no distension.      Palpations: There is no mass.      Tenderness: There is abdominal tenderness. There is no right CVA tenderness, left CVA tenderness, guarding or rebound. Negative signs include Slaughter's sign, McBurney's sign and obturator sign.      Hernia: No hernia is present.      Comments: Right sided  mild abdominal tendnerness   ? Cva tenderness   Neurological:      Mental Status: He is alert.                ASSESSMENT/PLAN:     Encounter  Diagnoses   Name Primary?    Chronic right-sided low back pain with right-sided sciatica Yes    Right testicular pain     Right sided abdominal pain        1. Chronic right-sided low back pain with right-sided sciatica  Suspect radiculopathy  - XR LUMBAR SPINE (MIN 2 VIEWS) (CPT=72100); Future  - Urinalysis with Culture Reflex  - CT ABDOMEN+PELVIS (W AND W/O CONTRAST)(CPT=74178); Future  - Physiatry Referral - In Network    2. Right testicular pain  Check labs/ urine  Suspect radicular  Test us reviewed from 2018   - CT ABDOMEN+PELVIS (W AND W/O CONTRAST)(CPT=74178); Future    3. Right sided abdominal pain  Chronic  Check labs, and CT    - CT ABDOMEN+PELVIS (W AND W/O CONTRAST)(CPT=74178); Future  - CBC With Differential With Platelet; Future  - Comp Metabolic Panel (14); Future    Has follow up with pcp scheduled 1/22/25   Orders Placed This Encounter   Procedures    Urinalysis with Culture Reflex    CBC With Differential With Platelet    Comp Metabolic Panel (14)         The above note was creating using Dragon speech recognition technology. Please excuse any typos    Meds This Visit:  Requested Prescriptions      No prescriptions requested or ordered in this encounter       Imaging & Referrals:  PHYSIATRY - INTERNAL  XR LUMBAR SPINE (MIN 2 VIEWS) (CPT=72100)  CT ABDOMEN+PELVIS(ALL W+WO)(CPT=74178)       ID#[id number]       [1] No Known Allergies

## 2025-01-21 ENCOUNTER — TELEPHONE (OUTPATIENT)
Dept: FAMILY MEDICINE CLINIC | Facility: CLINIC | Age: 25
End: 2025-01-21

## 2025-01-21 NOTE — TELEPHONE ENCOUNTER
Patient scheduled tomorrow for \"I think my appendix is ruptured or it’s hurting my right side hurts\"  Appointment was made on 1/1/25.  On 1/2/35, patient called and spoke with nurse and was scheduled to see Dr. Ellis.  Patient saw Dr. Ellis 1/14/25 and was referred to PMR.  Patient is scheduled to see Dr Smith on 2/11/25.    Called patient, no answer.     Left message asking patient to call and speak with a nurse if he is having a new pain. Asked patient to let us know if he still needs to see Dr. Gonzalez for this appointment/issue.

## (undated) NOTE — ED AVS SNAPSHOT
Amparo Good   MRN: [id number]    Department:  [facility] Emergency Department   Date of Visit:  9/20/2018           Disclosure     Insurance plans vary and the physician(s) referred by the ER may not be covered by your plan.  Please contact yo CARE PHYSICIAN AT ONCE OR RETURN IMMEDIATELY TO THE EMERGENCY DEPARTMENT. If you have been prescribed any medication(s), please fill your prescription right away and begin taking the medication(s) as directed.   If you believe that any of the medications

## (undated) NOTE — LETTER
8/10/2023          To Whom It May Concern:    Jane Stiles is currently under my medical care and may not return to work at this time. Please excuse [employer] for 3 days, starting on 8/9/23. He may return to work on 8/14/23. Activity is restricted as follows: none. If you require additional information please contact our office.         Sincerely,    Maggie Nowak DO          Document generated by:  Maggie Nowak DO